# Patient Record
Sex: MALE | Race: WHITE
[De-identification: names, ages, dates, MRNs, and addresses within clinical notes are randomized per-mention and may not be internally consistent; named-entity substitution may affect disease eponyms.]

---

## 2017-06-25 ENCOUNTER — HOSPITAL ENCOUNTER (EMERGENCY)
Dept: HOSPITAL 25 - ED | Age: 72
Discharge: HOME | End: 2017-06-25
Payer: MEDICARE

## 2017-06-25 VITALS — DIASTOLIC BLOOD PRESSURE: 69 MMHG | SYSTOLIC BLOOD PRESSURE: 115 MMHG
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
## 2017-06-25 DIAGNOSIS — S50.311A: ICD-10-CM

## 2017-06-25 DIAGNOSIS — S63.259A: Primary | ICD-10-CM
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
## 2017-06-25 PROCEDURE — 73140 X-RAY EXAM OF FINGER(S): CPT

## 2017-06-25 PROCEDURE — 99283 EMERGENCY DEPT VISIT LOW MDM: CPT

## 2017-06-25 NOTE — RAD
INDICATION:  Left fifth finger injury.



TECHNIQUE: 3 views of the left fifth finger were obtained.



FINDINGS: The middle phalanx is subluxed posterior and medial relative to the proximal

phalanx. No acute fracture is seen. There is an old healed boxer's fracture of the fifth

metacarpal.  



IMPRESSION:  SUBLUXATION OF THE PROXIMAL INTERPHALANGEAL JOINT.

## 2017-06-25 NOTE — RAD
INDICATION:  Right fifth finger subluxation status post reduction.



COMPARISON: Comparison is made with the prereduction films of the same day.



TECHNIQUE: 3 views of the right fifth finger were obtained.



FINDINGS: The bones are in normal alignment. There has been interval reduction of the

previously noted subluxation of the proximal interphalangeal joint.  No acute fracture is

seen. There is an old healed boxer's fracture of the fifth metacarpal.



There is mild osteoarthritic change in the proximal and distal interphalangeal joints.



IMPRESSION:  STATUS POST EXTERNAL REDUCTION. THE BONES ARE IN NORMAL ALIGNMENT.

## 2017-06-25 NOTE — ED
Dirk HOYT Alok, scribed for Fernandez Villalpando MD on 06/25/17 at 1436 .





Adult Trauma





- HPI Summary


HPI Summary: 


72M presents to the ED BIBA following fall off of ladder. Pt was reportedly on 

a ladder indoors when he lost his balance reportedly falling 3 feet to the 

ground. Pt states he landed on his back and hit the back of his head on a 

plastic surface. Pt notes right elbow pain and right 5th finger pain. Pt denies 

neck pain or LOC.








- History of Current Complaint


Chief Complaint: EDTraumaMultiple


Stated Complaint: FALL 12 FEET


Time Seen by Provider: 06/25/17 14:25


Hx Obtained From: Patient


Mechanism of Injury: Fall


Ambulatory at the Scene: Yes


Loss of Consciousness: no loss of consciousness


Onset/Duration: Traumatic, Still Present


Onset of Pain: Immediate


Onset Severity: Moderate


Current Severity: Moderate


Pain Intensity: 7


Pain Scale Used: 0-10 Numeric


Location: Head, Extremities


Aggravating Factor(s): Nothing


Alleviating Factor(s): Nothing


Associated Signs & Symptoms: Negative: Loss of Consciousness





- Allergy/Home Medications


Allergies/Adverse Reactions: 


 Allergies











Allergy/AdvReac Type Severity Reaction Status Date / Time


 


No Known Allergies Allergy   Verified 03/07/13 07:56














PMH/Surg Hx/FS Hx/Imm Hx


Endocrine/Hematology History: 


   Denies: Hx Anticoagulant Therapy, Hx Diabetes, Hx Thyroid Disease


Cardiovascular History: 


   Denies: Hx Hypertension, Hx Pacemaker/ICD


Respiratory History: 


   Denies: Hx Asthma, Hx Chronic Obstructive Pulmonary Disease (COPD)


 History: 


   Denies: Hx Renal Disease


Neurological History: 


   Denies: Hx Dementia, Hx Seizures


Psychiatric History: 


   Denies: Hx Substance Abuse





- Immunization History


Date of Tetanus Vaccine: 2007


Infectious Disease History: No


Infectious Disease History: 


   Denies: Hx Hepatitis, Hx Human Immunodeficiency Virus (HIV), Traveled 

Outside the [place] in Last 30 Days





- Family History


Known Family History: 


   Negative: Cardiac Disease, Hypertension, Diabetes





- Social History


Occupation: Employed Full-time


Lives: With Family


Substance Use Type: Reports: None





Review of Systems


Negative: Fever


Positive: Other - right elbow and right 5th finger pain. Negative: Neck pain


All Other Systems Reviewed And Are Negative: Yes





Physical Exam


Triage Information Reviewed: Yes


Vital Signs On Initial Exam: 


 Initial Vitals











Temp Pulse Resp BP Pulse Ox


 


 97.4 F   59   18   117/65   100 


 


 06/25/17 14:15  06/25/17 14:15  06/25/17 14:15  06/25/17 14:15  06/25/17 14:15











Vital Signs Reviewed: Yes


Appearance: Positive: Well-Appearing, No Pain Distress


Skin: Positive: Warm, Skin Color Reflects Adequate Perfusion, Dry


Head/Face: Positive: Normal Head/Face Inspection


Eyes: Positive: Normal


ENT: Positive: Normal ENT inspection


Neck: Positive: Supple, Nontender


Respiratory/Lung Sounds: Positive: Clear to Auscultation, Breath Sounds Present


Cardiovascular: Positive: RRR


Abdomen Description: Positive: Nontender, Soft


Bowel Sounds: Positive: Present


Musculoskeletal: Positive: Other - right hand 5th finger PIP dislocation with 

valve varus deformity


Neurological: Positive: Normal


Psychiatric: Positive: Normal, Affect/Mood Appropriate





Diagnostics





- Vital Signs


 Vital Signs











  Temp Pulse Resp BP Pulse Ox


 


 06/25/17 14:18  97.8 F  59  17  117/65  100


 


 06/25/17 14:15  97.4 F  59  18  117/65  100














- Laboratory


Lab Statement: Any lab studies that have been ordered have been reviewed, and 

results considered in the medical decision making process.





- Radiology


  ** Finger XRAY


Xray Interpretation: Positive (See Comments) - IMPRESSION:  SUBLUXATION OF THE 

PROXIMAL INTERPHALANGEAL JOINT.


Radiology Interpretation Completed By: Radiologist





  ** Finger XRAY (Post Reduction)


Xray Interpretation: Positive (See Comments) - IMPRESSION:  STATUS POST 

EXTERNAL REDUCTION. THE BONES ARE IN NORMAL ALIGNMENT.


Radiology Interpretation Completed By: Radiologist





Adult Trauma Course/Dx





- Course


Course Of Treatment: Mr. Malik fell off a ladder he was using to nail on a 

twelve foot ceiling.  He sat hard and hit his right hadn dislocating his 5th 

finger.  He took one look at it and got pale and diaphoretic.  His W/U here 

revealed only some minor laceerations and the PIP dislocation without fracture 

of his right 5th finger.  He was digitally blocked with 25% bupivicaine (2 cc's

) and the finger was reduced easily after anesthesia.  He was michelle taped and D/

C'd in stable condition.





- Diagnoses


Provider Diagnoses: 


 Dislocation, finger closed, Abrasions of multiple sites








Discharge





- Discharge Plan


Condition: Stable


Disposition: HOME


Patient Education Materials:  Finger Dislocation (ED)


Referrals: 


Tad Moore MD [Primary Care Provider] - 





The documentation as recorded by the scribDirk alcantara Alok accurately reflects 

the service I personally performed and the decisions made by me, Fernandez Villalpando MD.

## 2019-06-17 ENCOUNTER — HOSPITAL ENCOUNTER (INPATIENT)
Dept: HOSPITAL 25 - ED | Age: 74
Discharge: HOME | DRG: 310 | End: 2019-06-17
Attending: HOSPITALIST | Admitting: INTERNAL MEDICINE
Payer: MEDICARE

## 2019-06-17 VITALS — SYSTOLIC BLOOD PRESSURE: 102 MMHG | DIASTOLIC BLOOD PRESSURE: 71 MMHG

## 2019-06-17 DIAGNOSIS — I50.9: ICD-10-CM

## 2019-06-17 DIAGNOSIS — R79.89: ICD-10-CM

## 2019-06-17 DIAGNOSIS — Z85.46: ICD-10-CM

## 2019-06-17 DIAGNOSIS — R00.0: ICD-10-CM

## 2019-06-17 DIAGNOSIS — R74.0: ICD-10-CM

## 2019-06-17 DIAGNOSIS — Z66: ICD-10-CM

## 2019-06-17 DIAGNOSIS — R01.1: ICD-10-CM

## 2019-06-17 DIAGNOSIS — Z85.820: ICD-10-CM

## 2019-06-17 DIAGNOSIS — I48.91: Primary | ICD-10-CM

## 2019-06-17 DIAGNOSIS — M19.90: ICD-10-CM

## 2019-06-17 DIAGNOSIS — D75.89: ICD-10-CM

## 2019-06-17 DIAGNOSIS — Z79.899: ICD-10-CM

## 2019-06-17 DIAGNOSIS — Z80.42: ICD-10-CM

## 2019-06-17 DIAGNOSIS — F17.200: ICD-10-CM

## 2019-06-17 DIAGNOSIS — I08.1: ICD-10-CM

## 2019-06-17 LAB
ALBUMIN SERPL BCG-MCNC: 3.8 G/DL (ref 3.2–5.2)
ALBUMIN/GLOB SERPL: 1.7 {RATIO} (ref 1–3)
ALP SERPL-CCNC: 58 U/L (ref 34–104)
ALT SERPL W P-5'-P-CCNC: 181 U/L (ref 7–52)
ANION GAP SERPL CALC-SCNC: 4 MMOL/L (ref 2–11)
APTT PPP: 34.9 SECONDS (ref 26–38)
AST SERPL-CCNC: 141 U/L (ref 13–39)
BASOPHILS # BLD AUTO: 0 10^3/UL (ref 0–0.2)
BUN SERPL-MCNC: 26 MG/DL (ref 6–24)
BUN/CREAT SERPL: 23 (ref 8–20)
CALCIUM SERPL-MCNC: 9 MG/DL (ref 8.6–10.3)
CHLORIDE SERPL-SCNC: 110 MMOL/L (ref 101–111)
CHOLEST SERPL-MCNC: 131 MG/DL
EOSINOPHIL # BLD AUTO: 0.1 10^3/UL (ref 0–0.6)
FOLATE SERPL-MCNC: 13.79 NG/ML (ref 3.99–?)
GLOBULIN SER CALC-MCNC: 2.3 G/DL (ref 2–4)
GLUCOSE SERPL-MCNC: 104 MG/DL (ref 70–100)
HCO3 SERPL-SCNC: 27 MMOL/L (ref 22–32)
HCT VFR BLD AUTO: 40 % (ref 42–52)
HDLC SERPL-MCNC: 44.3 MG/DL
HGB BLD-MCNC: 13.4 G/DL (ref 14–18)
INR PPP/BLD: 1.23 (ref 0.82–1.09)
LYMPHOCYTES # BLD AUTO: 1.2 10^3/UL (ref 1–4.8)
MAGNESIUM SERPL-MCNC: 2.1 MG/DL (ref 1.9–2.7)
MCH RBC QN AUTO: 32 PG (ref 27–31)
MCHC RBC AUTO-ENTMCNC: 34 G/DL (ref 31–36)
MCV RBC AUTO: 95 FL (ref 80–94)
MONOCYTES # BLD AUTO: 0.3 10^3/UL (ref 0–0.8)
NEUTROPHILS # BLD AUTO: 3.1 10^3/UL (ref 1.5–7.7)
NRBC # BLD AUTO: 0 10^3/UL
NRBC BLD QL AUTO: 0
PLATELET # BLD AUTO: 175 10^3/UL (ref 150–450)
POTASSIUM SERPL-SCNC: 4.7 MMOL/L (ref 3.5–5)
PROT SERPL-MCNC: 6.1 G/DL (ref 6.4–8.9)
RBC # BLD AUTO: 4.2 10^6 /UL (ref 4.18–5.48)
SODIUM SERPL-SCNC: 141 MMOL/L (ref 135–145)
TRIGL SERPL-MCNC: 57 MG/DL
TROPONIN I SERPL-MCNC: 0.01 NG/ML (ref ?–0.04)
TSH SERPL-ACNC: 3.99 MCIU/ML (ref 0.34–5.6)
WBC # BLD AUTO: 4.7 10^3/UL (ref 3.5–10.8)

## 2019-06-17 PROCEDURE — 80074 ACUTE HEPATITIS PANEL: CPT

## 2019-06-17 PROCEDURE — 5A2204Z RESTORATION OF CARDIAC RHYTHM, SINGLE: ICD-10-PCS | Performed by: SPECIALIST

## 2019-06-17 PROCEDURE — 83880 ASSAY OF NATRIURETIC PEPTIDE: CPT

## 2019-06-17 PROCEDURE — 83735 ASSAY OF MAGNESIUM: CPT

## 2019-06-17 PROCEDURE — B246ZZ4 ULTRASONOGRAPHY OF RIGHT AND LEFT HEART, TRANSESOPHAGEAL: ICD-10-PCS | Performed by: SPECIALIST

## 2019-06-17 PROCEDURE — 80061 LIPID PANEL: CPT

## 2019-06-17 PROCEDURE — 83036 HEMOGLOBIN GLYCOSYLATED A1C: CPT

## 2019-06-17 PROCEDURE — 99156 MOD SED OTH PHYS/QHP 5/>YRS: CPT

## 2019-06-17 PROCEDURE — 99284 EMERGENCY DEPT VISIT MOD MDM: CPT

## 2019-06-17 PROCEDURE — 85379 FIBRIN DEGRADATION QUANT: CPT

## 2019-06-17 PROCEDURE — 93312 ECHO TRANSESOPHAGEAL: CPT

## 2019-06-17 PROCEDURE — 76705 ECHO EXAM OF ABDOMEN: CPT

## 2019-06-17 PROCEDURE — 82746 ASSAY OF FOLIC ACID SERUM: CPT

## 2019-06-17 PROCEDURE — 93325 DOPPLER ECHO COLOR FLOW MAPG: CPT

## 2019-06-17 PROCEDURE — 84443 ASSAY THYROID STIM HORMONE: CPT

## 2019-06-17 PROCEDURE — 80053 COMPREHEN METABOLIC PANEL: CPT

## 2019-06-17 PROCEDURE — 85730 THROMBOPLASTIN TIME PARTIAL: CPT

## 2019-06-17 PROCEDURE — 93005 ELECTROCARDIOGRAM TRACING: CPT

## 2019-06-17 PROCEDURE — 82607 VITAMIN B-12: CPT

## 2019-06-17 PROCEDURE — 99157 MOD SED OTHER PHYS/QHP EA: CPT

## 2019-06-17 PROCEDURE — 36415 COLL VENOUS BLD VENIPUNCTURE: CPT

## 2019-06-17 PROCEDURE — 71275 CT ANGIOGRAPHY CHEST: CPT

## 2019-06-17 PROCEDURE — 85025 COMPLETE CBC W/AUTO DIFF WBC: CPT

## 2019-06-17 PROCEDURE — 85610 PROTHROMBIN TIME: CPT

## 2019-06-17 PROCEDURE — 84484 ASSAY OF TROPONIN QUANT: CPT

## 2019-06-17 PROCEDURE — 71045 X-RAY EXAM CHEST 1 VIEW: CPT

## 2019-06-17 RX ADMIN — DILTIAZEM HYDROCHLORIDE SCH MG: 30 TABLET, FILM COATED ORAL at 17:27

## 2019-06-17 RX ADMIN — DILTIAZEM HYDROCHLORIDE SCH: 30 TABLET, FILM COATED ORAL at 14:17

## 2019-06-17 RX ADMIN — DILTIAZEM HYDROCHLORIDE SCH MG: 30 TABLET, FILM COATED ORAL at 06:40

## 2019-06-17 NOTE — CARD
CC:  Dr. St, [facility] *

 

CARDIOVERSION REPORT:

 

DATE OF PROCEDURE:  06/17/19 - ROOM #[id number]

 

PROCEDURE:  Cardioversion.

 

INDICATION:  Atrial fibrillation.

 

The patient is a 74-year-old gentleman with very little past medical history, 
who presented with shortness of breath and palpitations.  He was found to be in 
atrial fibrillation.  The patient had just undergone a transesophageal 
echocardiogram demonstrating normal LV size and systolic function.  No evidence 
of thrombus in the left atrial appendage.  Cardioversion was recommended.

 

DESCRIPTION OF PROCEDURE:  The patient was given an additional 2 mg of Versed 
for conscious sedation.  The patient was cardioverted with 150 joules of 
synchronized biphasic energy.  The patient converted to normal sinus rhythm, 
which lasted about 2 minutes and then back to atrial fibrillation.

 

Repeat cardioversion was recommended.  The cardioversion normal sinus rhythm at 
that time.  Further recommendations pending his hospital course.



Patient maintained NSR   Patient will be discharged on Eliquis, Multaq and 
Metoprolol.  Follow up in 2-3 weeks  

 

 [id number]/CPS #: [id number]

ALEIDA

## 2019-06-17 NOTE — HP
HISTORY AND PHYSICAL:

 

DATE OF ADMISSION:  06/17/19

 

PRIMARY CARE PROVIDER:  Dr. Thalia St.

 

:  Mattie Malik, patient's wife.

 

CHIEF COMPLAINT:  Palpitations.

 

CODE STATUS:  DNR.

 

SOURCE OF INFORMATION:  HPI is obtained from the patient.  He is an excellent 
historian.

 

HISTORY OF PRESENT ILLNESS:  This is a 74-year-old male with a past medical 
history of prostate cancer, status post prostatectomy; distant local melanoma, 
status post excision who, presents with complaint of palpitations over 2 weeks.
  Patient reports that he has had this sensation of his heart is racing 
intermittently for 2 weeks.  It has been accompanied with shortness of breath 
and dizziness.  They come and go.  These episodes then last several minutes, 
but he is able to go about his day and do his activities of daily living.  Over 
the last 7 days, he has had worsening sensation of shortness of breath, 
particularly when lying flat and has had several instances of orthopnea and 
paroxysmal nocturnal dyspnea where he wakes from sleep gasping for breath.  
Because of these persistent symptoms that did not seem to vaibhav with any 
maneuvers, patient decided to present to the emergency room. At baseline, 
patient is quite healthy and is able to run 4 miles a day and he reports that 
for the last 2 weeks, he has been unable to run more than a quarter of a mile 
secondary to shortness of breath.  He denies any suzi chest pain during these 
events and has no other associated symptoms.  No syncope and no dizziness.

 

EMERGENCY ROOM COURSE:  Vital signs on arrival, blood pressure of 139/90, pulse 
rate 70, sinus initially, temperature 97.2, respiratory rate 17, satting 100% 
on room air.  Once in the emergency room and placed on the monitor, he had 
tachycardia to the one teens and an EKG showed atrial fibrillation with 
ventricular rate with T- wave inversions in 2, 3.  Labs were drawn, which 
showed macrocytosis without anemia and elevated D-dimer of 355, elevated AST 
and ALT and a BNP of 466.  A chest x-ray showed prominent vasculature 
bilaterally.  A CTA was also completed to rule out pulmonary embolism, which by 
wet read was negative, although official report is still pending at the time of 
dictation.  In the emergency room, he received diltiazem 20 mg with good effect
, aspirin 324 and 1 L of IV fluid bolus.  Because of these persistent symptoms 
of new AFib, the hospitalist team was asked to evaluate and admit this patient 
for further workup.

 

PAST MEDICAL HISTORY:

1.  Prostate cancer, status post prostatectomy.

2.  Distant history of melanoma, status post excision.

 

PAST SURGICAL HISTORY:

1.  Status post prostatectomy.

2.  Bilateral rotator cuff repair.

 

SOCIAL HISTORY:  Patient is retired [employer] employee and currently self-employed 
as farmer with an orchard.  He lives with his wife.  Tobacco:  He is a current 
oral tobacco user with dips and has been for 20 years.  Alcohol:  Has abstained 
for the last 15 years.  In the past with distant alcohol use disorder.  Illicits
:  Never.

 

FAMILY HISTORY:  Positive for father who had prostate cancer and mother who is 
healthy.

 

ALLERGIES:  None.

 

MEDICATIONS:  None.

 

REVIEW OF SYSTEMS:  Constitutional:  Negative for fever or chills.  HEENT: 
Negative for vision changes, headaches, or sore throat.  Cardiovascular:  
Positive for palpitations, orthopnea, or paroxysmal nocturnal dyspnea and 
negative for chest pain.  Respiratory:  Positive for shortness of breath.  
Negative for cough, pleuritic chest pain.  GI:  Negative for nausea, vomiting, 
diarrhea, or abdominal pain.  :  Negative for dysuria, hematuria.  
Musculoskeletal:  Negative for myalgias, arthralgias or weakness.  Skin:  
Negative for rashes or lesions. Neurologic:  Negative for focal weakness or 
numbness.  Psychiatric:  Negative for anxiety or depression.  Endocrine:  
Negative for polyuria, polydipsia.  Heme: Negative for easy bruising, bleeding 
or lymph adenopathy.

 

                               PHYSICAL EXAMINATION

 

GENERAL APPEARANCE:  This is a well-appearing pleasant man in no acute distress
, sitting up in a  stretcher.

 

VITAL SIGNS:  At the time of physical exam, heart rate 115 in atrial 
fibrillation, blood pressure is 136/84, respiratory rate is 15, satting 100% on 
room air.

 

HEENT:  Pupils are equal and reactive.  Extraocular muscles are intact.  
Sclerae are anicteric.  Mouth has moist mucous membranes.

 

NECK:  Supple with no supraclavicular or cervical lymphadenopathy.

 

RESPIRATORY:  Patient has mild crackles in right lung base, but otherwise clear 
to auscultation.

 

CARDIAC:  Irregularly irregular with no murmur, rub, or gallop.  He has mildly 
elevated JVD at 6 cm.

 

ABDOMEN:  Belly is soft, nontender, nondistended with normoactive bowel sounds.

 

SKIN:  Without rashes or lesions.

 

MUSCULOSKELETAL:  He moves all 4 extremities without pain.

 

EXTREMITIES:  He has trace pitting edema in bilateral lower extremities and 2+ 
pulses and bilateral lower extremity DPs.

 

NEURO:  Cranial nerves II through XII are intact.  He has no focal deficits.  
He is A and O x4.

 

 LABORATORY DATA:  White blood cell count 4.7, hemoglobin 13.4, hematocrit 40, 
platelets 175.  INR 1.23.  D-dimer is 355.  CMP:  Sodium 141, potassium 4.7, 
chloride 110, carbon dioxide 27, BUN 26, creatinine 1.13, glucose 104, 
magnesium 2.1, total bili is 0.7, , , alk phos 58.  Troponin 
0.01.  . TSH 3.99.

 

IMAGING:  Includes a chest x-ray which shows a bilateral prominent vasculature 
and increased interstitial markings.  EKG that shows atrial fibrillation with 
ventricular rate 115, T-wave inversions in the 2 and 3.  CTA of chest is pending
, but per wet read, no  acute pulmonary embolism.

 

Imaging, labs and EKG were reviewed by myself.

 

ASSESSMENT AND PLAN:  This is a 74-year-old man with past medical history of 
distant prostate cancer, who presents with history of subacute palpitations and 
dyspnea on exertion and was found to have new onset of atrial fibrillation and 
signs of volume overload concerning for new onset heart failure.

 

1.  Atrial fibrillation.  The patient has been in and out of this rhythm for 2 
weeks.  His WIN-VASc score is 1.  He has no known risk factors.  Pulmonary 
embolism has been ruled out.  He would consider a sleep apnea screen.  We will 
consult cardiology to discuss ultimate management of rate, rhythm control or 
other options such as cardioversion moving forward.  As above, WIN-VASc score 
is 1 and long-term anticoagulation is a shared decision making process with 
patient and primary care provider.  I will therapeutically anticoagulate 
patient one time with Lovenox and ultimate decision on long-term 
anticoagulation can be made in the course of this hospitalization.

2.  Volume overload Patient appears to have volume overload and possibly this 
is tachycardia mediated mild heart failure.  An echocardiogram has been 
ordered.  We would consider stress test in this patient if had more convincing 
risk factors and per Cardiology decision.  We will diurese with Lasix 40 mg IV 
x1 now and can diurese as tolerated.

3.  Elevated liver function testing.  Has a non-cholestatic pattern, it is 
possibly congestive hepatopathy from heart failure. We will send off hepatitis 
panel as well as a liver ultrasound.

4.  Mild anemia with macrocytosis, folate and B12 were ordered as part of this 
workup.

5.  Distant prostate cancer.  No active issues.

6.  FEN.  NPO for liver ultrasound and can be upgraded to heart-healthy diet.

7.  DVT prophylaxis.  Patient is therapeutically anticoagulated with Lovenox. 
Decision to continue therapeutic anticoagulation versus prophylactic can be 
made over the course of his hospital stay.

8.  Code status is DNR.  MOLST completed at the bedside.

9.  Disposition.  Patient is stable for admission to [address] [place] [address] for 
telemetry.

 

TIME SPENT:  Forty five minutes was spent in the planning of this admission 
with over half of that spent directly at the bedside with the patient providing 
direct patient care.  Plan of care discussed with patient and family, they had 
no further questions.

 

 

 

[id number]/CPS #: [id number]

ALEIDA

## 2019-06-17 NOTE — CONS
CC:  [facility] *

 

CARDIOLOGY CONSULTATION:

 

DATE OF CONSULT:  06/17/19

 

INDICATION FOR CONSULTATION:  Atrial fibrillation.

 

HISTORY OF PRESENT ILLNESS:  The patient is a 74-year-old gentleman with little 
past medical history, who came to the emergency room because of palpitations 
and shortness of breath.  The patient states that for the past 2 weeks or so, 
he has been noticing an increase in palpitations and episodes of heart racing.  
The patient states for the last 3 nights, it has been difficult for him to lie 
flat, he has been sitting up in a chair or pacing because of his shortness of 
breath.  He denies any chest pain.  He denies lightheadedness, dizziness, or 
syncope.

 

The patient is very physically active.  He runs on a regular basis.  He has 
been unable to run for the last four days or so.

 

The patient states he has a history of "murmur" and has had palpitations most 
of his life.  Generally, they are brief and do not interfere with his lifestyle.

 

The patient denies any lower extremity edema.  He does have orthopnea.

 

PAST MEDICAL HISTORY:  Significant for prostate enlargement, arthritis, and 
melanoma.

 

PAST SURGICAL HISTORY:

1.  Bilateral shoulder rotator cuff surgeries.

2.  Melanoma surgery.

3.  Prostatectomy.

 

OUTPATIENT MEDICATION:  Ciprofloxacin.

 

ALLERGIES:  No known drug allergies.

 

FAMILY HISTORY:  No family history of early coronary artery disease or cardiac 
arrhythmias.

 

SOCIAL HISTORY:  He is retired.  He is .  He is very physically active.  
He denies tobacco or alcohol use.

 

REVIEW OF SYSTEMS:  Negative for fevers and chills.  Negative for changes in 
bowel or bladder habits.  Negative for change in weight.  Other 12-point review 
is unremarkable.

 

PHYSICAL EXAM:  Height is 6 feet, weight is 187 pounds.  Temperature 98, heart 
rate 84, blood pressure 104/78, respiratory rate is 20, oxygen saturation 95% 
on room air.  Sclerae anicteric.  Oropharynx is pink without erythema.  
Carotids are 2+ without bruits.  JVD is normal.  Thyroid is normal.  Cardiac 
Exam:  S1, S2, irregular.  No murmurs, rubs, or gallops.  PMI is normal.  Lungs 
are clear to auscultation bilaterally.  No dullness to percussion.  Abdomen is 
soft, nontender, and nondistended with normoactive bowel sounds.  Extremities 
show no edema.  He has 2+ pulses throughout.  The patient is awake, alert, and 
oriented.  He moves all 4 extremities equally.

 

DIAGNOSTIC STUDIES/LAB DATA:  Chemistries within normal limits.  BUN 26, 
creatinine 1.1.  AST and ALT are slightly elevated at 141 and 181.  BNP is 416.
  Total cholesterol 131.  LDL cholesterol 75.  TSH 3.99.  CBC within normal 
limits.

 

EKG shows atrial fibrillation with LVH.

 

IMPRESSION:  This is a 74-year-old gentleman with little past medical history, 
who came to the hospital because of increasing shortness of breath and 
palpitations. The patient was found to be in atrial fibrillation.  The patient 
was started on enoxaparin 80 mg twice a day and a Cardizem drip.

 

The patient's CT of the chest showed no evidence of pulmonary embolism.

 

The patient has slightly elevated transaminase levels, this could be due to 
passive congestion from mild congestive heart failure.  The patient was given a 
diuretic in the emergency room and had a significant diuresis.

 

PLAN/RECOMMENDATIONS:  For now my recommendations, continue on his current 
medications.  I did talk to the patient at length regarding treatment of his 
atrial fibrillation, talked about anticoagulation, antiarrhythmic medications, 
potential cardioversion, and ablative therapy.

 

For now, the plan is to continue his anticoagulation.  The patient is already 
n.p.o.

 

We will schedule for a transesophageal echocardiogram today and potential 
cardioversion back to normal sinus rhythm.  After he is back in normal sinus 
rhythm, the patient will be started on oral anticoagulants and antiarrhythmics.
  I will probably send him out on Multaq.  The patient will be seen in follow 
up. Further recommendations pending results of his transesophageal 
echocardiogram and cardioversion.

 

 [id number]/CPS #: [id number]

ALEIDA

## 2019-06-17 NOTE — ED
HPI Cardiac





- HPI Summary


HPI Summary: 


Patient is a 73 y/o M presenting to ED with complaints of palpitations. 

Palpitations are characterized as "racing" and he reports that palpitations 

have been present intermittently for the past two weeks. He notes some 

dizziness and SOB with palpitations. He also states that he has been getting 

panic attacks at night, stating that when he lies down it feels like something 

is "choking my throat". Patient notes a few days ago he became SOB and 

experienced palpations while running and that he was not able to run more than 1

/4 of a mile. He states that he runs around 4 miles regularly. He denies any 

daily medications, alcohol and substances usage. Patient is a non-smoker. No 

pain at present. On triage, pain is denied, nothing is noted to aggravate/

alleviate Sx. Home medications and allergies are reviewed. 








- History of Current Complaint


Stated Complaint: RAPID HR PER PT


Hx Obtained From: Patient


Onset/Duration: Started Weeks Ago - a couple weeks ago


Timing: Intermittent - a couple weeks ago


Current Severity: None - pain denied


Pain Intensity: 0


Pain Scale Used: 0-10 Numeric


Character: Fast - "racing"


Aggravating Factor(s): Nothing


Alleviating Factor(s): Nothing


Associated Signs and Symptoms: Positive: Dizziness, Shortness of Breath





- Allergy/Home Medications


Allergies/Adverse Reactions: 


 Allergies











Allergy/AdvReac Type Severity Reaction Status Date / Time


 


No Known Allergies Allergy   Verified 06/17/19 01:34











Home Medications: 


 Home Medications





Ciprofloxacin TAB* [Cipro 500 MG TAB*] 1 tab PO BID 06/17/19 [History Confirmed 

06/17/19]











PMH/Surg Hx/FS Hx/Imm Hx


Endocrine/Hematology History: 


   Denies: Hx Anticoagulant Therapy, Hx Diabetes, Hx Thyroid Disease


Cardiovascular History: 


   Denies: Hx Hypertension, Hx Pacemaker/ICD


Respiratory History: 


   Denies: Hx Asthma, Hx Chronic Obstructive Pulmonary Disease (COPD)


 History: 


   Denies: Hx Renal Disease


Neurological History: 


   Denies: Hx Dementia, Hx Seizures


Psychiatric History: 


   Denies: Hx Substance Abuse





- Immunization History


Date of Tetanus Vaccine: 2007


Infectious Disease History: No


Infectious Disease History: 


   Denies: Hx Hepatitis, Hx Human Immunodeficiency Virus (HIV), Traveled 

Outside the [place] in Last 30 Days





- Family History


Known Family History: 


   Negative: Cardiac Disease, Hypertension, Diabetes





- Social History


Alcohol Use: Occasionally


Substance Use Type: Reports: None


Smoking Status (MU): Former Smoker





Review of Systems


Constitutional: Other - positive - dizziness 


Positive: Palpitations


Positive: Shortness Of Breath


All Other Systems Reviewed And Are Negative: Yes





Physical Exam





- Summary


Physical Exam Summary: 


VITAL SIGNS: Reviewed.


GENERAL:  Patient is a well-developed and nourished male who is lying 

comfortable in the stretcher. Patient is not in any acute respiratory distress.


HEAD AND FACE: No signs of trauma. No ecchymosis, hematomas or skull 

depressions. No sinus tenderness.


EYES: PERRLA, EOMI x 2, No injected conjunctiva, no nystagmus.


EARS: Hearing grossly intact. Ear canals and tympanic membranes are within 

normal limits.


MOUTH: Oropharynx within normal limits.


NECK: Supple, trachea is midline, no adenopathy, no JVD, no carotid bruit, no c-

spine tenderness, neck with full ROM


CHEST: Symmetric, no tenderness at palpation


LUNGS: Clear to auscultation bilaterally. No wheezing or crackles.


CVS: irregularly tachycardic, S1 and S2 present, no murmurs or gallops 

appreciated.


ABDOMEN: Soft, non-tender. No signs of distention. No rebound no guarding, and 

no masses palpated. Bowel sounds are normal.


EXTREMITIES: FROM in all major joints, no edema, no cyanosis or clubbing.


NEURO: Alert and oriented x 3. No acute neurological deficits. Speech is normal 

and follows commands.


SKIN: Dry and warm








Triage Information Reviewed: Yes


Vital Signs On Initial Exam: 


 Initial Vitals











Temp Pulse Resp BP Pulse Ox


 


 97.2 F   65   17   139/90   100 


 


 06/17/19 01:32  06/17/19 01:32  06/17/19 01:32  06/17/19 01:32  06/17/19 01:32











Vital Signs Reviewed: Yes





Diagnostics





- Vital Signs


 Vital Signs











  Temp Pulse Resp BP Pulse Ox


 


 06/17/19 01:32  97.2 F  65  17  139/90  100














- Laboratory


Result Diagrams: 


 06/17/19 02:13





 06/17/19 02:13


Lab Statement: Any lab studies that have been ordered have been reviewed, and 

results considered in the medical decision making process.





- Radiology


  ** CXR


Radiology Interpretation Completed By: ED Physician


Summary of Radiographic Findings: CXR: bilateral increased markings, pending 

official report.





- CT


  ** CTA CHEST/THORAX


CT Interpretation Completed By: Radiologist


Summary of CT Findings: CTA CHEST/THORAXIMPRESSION:  1. No pulmonary emboli.  

2. Groundglass centrilobular nodules with intralobular septal thickening,.  

particularly in the right upper lobe, which may be due to atypical pneumonia.  

3. Small right pleural effusion.  4. Cardiomegaly with evidence of right 

cardiac dysfunction.  THIS REPORT WAS REVIEWED BY DR. GORDON.





- EKG


  ** 0200


Cardiac Rate: Other Rate - afib with rate of 117 BPM


EKG Rhythm: Atrial Fibrillation


Summary of EKG Findings: EKG showed afib with rate of 117 BPM, ST wave 

inversion in inferior leads.





Disposition





- Course


Course Of Treatment: Patient is a 73 y/o M presenting to ED with complaints of 

palpitations. Palpitations are characterized as "racing" and he reports that 

palpitations have been present intermittently for the past two weeks. He notes 

some dizziness and SOB with palpitations. He also states that he has been 

getting panic attacks at night, stating that when he lies down it feels like 

something is "choking my throat". Patient notes a few days ago he became SOB 

and experienced palpations while running and that he was not able to run more 

than 1/4 of a mile. He states that he runs around 4 miles regularly. He denies 

any daily medications, alcohol and substances usage. Patient is a non-smoker. 

No pain at present. On physical exam, patient is noted to be irregularly 

tachycardic. EKG showed afib with rate of 117 BPM, ST wave inversion in 

inferior leads. CXR: Bilateral increased markings.  CTA CHEST/THORAXIMPRESSION:

  1. No pulmonary emboli.  2. Groundglass centrilobular nodules with 

intralobular septal thickening,.  particularly in the right upper lobe, which 

may be due to atypical pneumonia.  3. Small right pleural effusion.  4. 

Cardiomegaly with evidence of right cardiac dysfunction.  Labs showed Hgb 13.4, 

Hct 40, MCV 95, MCH 32, INR 1.23, D-dimer 355, BUN 26, BUN/creatinine ratio 23, 

glucose 104, , , trop 0.01, , total protein 6.1.  During 

ED course, patient received fluids, diltiazem 20 mg IV SLOW PU ED,  mg 

PO ED.  0507 - Patient's case was discussed with Dr. Beckford, Dr. Beckford accepts for 

admission.





- Diagnoses


Provider Diagnoses: 


 A-fib








- Physician Notifications


Discussed Care Of Patient With: Anna Beckford


Time Discussed With Above Provider: 05:07


Instructed by Provider To: Other - 0507 - Patient's case was discussed with Dr. Beckford, Dr. Beckford accepts for admission.





Discharge





- Sign-Out/Discharge


Documenting (check all that apply): Patient Departure - admit





- Discharge Plan


Condition: Good


Disposition: ADMITTED TO [facility]


Referrals: 


Thalia St MD [Primary Care Provider] - 





- Attestation Statements


Document Initiated by Scribe: Yes


Documenting Scribe: LUCILLE LOBO


Provider For Whom Scribe is Documenting (Include Credential): JOSHUA GORDON MD


Scribe Attestation: 


LUCILLE HOYT, scribed for JOSHUA GORDON MD on 06/17/19 at 0512. 


Status of Scribe Document: Ready

## 2019-06-18 NOTE — DS
DISCHARGE SUMMARY:

 

DATE OF ADMISSION:  06/17/19

 

DATE OF DISCHARGE:  06/17/19

 

ATTENDING PROVIDER:  Anna Beckford MD.

 

ATTENDING PHYSICIAN WHILE DATE ON DISCHARGE:  Angel Mccord MD.

 

PRIMARY CARE PROVIDER:  Dr. Thalia St.

 

CONSULTING CARDIOLOGIST:  Dr. Jorden Oleary.

 

CHIEF COMPLAINT:  Palpitations, shortness of breath, dyspnea on exertion.

 

PRINCIPAL DIAGNOSIS:  New-onset atrial fibrillation with rapid ventricular 
response; mild congestive heart failure, likely secondary to tachyarrhythmia.

 

HISTORY OF PRESENT ILLNESS AND HOSPITAL COURSE:  Ricky Malik is a 74-year-
old male with past medical history of prostate cancer; status post prostatectomy
, local melanoma; status post excision who is otherwise in excellent health and 
near daily long distance runner.  Please see H and P of Dr. Anna Beckford for full 
details, but briefly, he has intermittently noticed racing of his heart for 2 
weeks accompanied by shortness of breath and dizziness.  He had worse shortness 
of breath on lying down flat for the last 7 days consistent with orthopnea and 
paroxysmal nocturnal dyspnea.  Upon presentation to the [facility], he 
had an EKG showing atrial fibrillation with RVR.  D-dimer was elevated at 355.  
His BNP was 466.  CTA chest ruled out pulmonary embolism, shows small right 
pleural effusion, and ground-glass subpleural lung nodules with interlobular 
septal thickening, particularly in the right upper lobe, which may be due to 
atypical pneumonia.  He was given IV diltiazem in the emergency room and then 
transitioned to oral diltiazem 30 q.6.  He was also started on Lovenox 1 mg/kg 
q.12 and seen in consultation with Dr. Oleary of cardiology who recommended ISAAC 
with potential cardioversion.  This occurred later in the afternoon and 
required 2 shocks, the first one did not maintain him in normal rhythm, but the 
second one did.  He was recommended discharge home with new prescription for 
antiarrhythmic Multaq, low-dose beta blocker, and Eliquis with close cardiology 
followup recommended.  Additional evaluation included slight transaminase 
elevation with , ALT at 181.  The T-bili and alk phos were normal.  This 
was possibly some mild hepatic congestion secondary to mild heart failure (he 
was on room air during the admission and they gave 1 dose of 40 mg IV Lasix 
there).  An acute hepatitis panel was sent and negative for hepatitis A IgM 
antibody, hepatitis B surface antigen, hepatitis B core IgM antibody, hepatitis 
C antibody, and a limited right upper quadrant ultrasound was also within 
normal limits.

 

DISCHARGE MEDICATIONS:  Include:

1.  Apixaban 5 mg p.o. b.i.d. (new).

2.  Dronedarone (Multaq) 40 mg p.o. b.i.d. (new).

3.  Metoprolol succinate 25 mg p.o. daily (new).

 

FOLLOWUP:  The patient is to follow up with Dr. Jorden Oleary in 1 to 2 weeks 
and should follow up with Dr. Thalia St within 1 to 2 weeks.

 

DISCHARGE DIET:  Heart healthy.

 

DISPOSITION:  Home.

 

CONDITION:  Improved.

 

TIME SPENT ON DISCHARGE:  35 minutes.

 

 [id number]/CPS #: [id number]

ALEIDA

## 2025-06-12 NOTE — TEE
*[facility]*

[place] [address]

Phone: [phone]

Fax #: [phone]



-------------------------------------------------------------------

Transesophageal Echocardiogram



Patient: King,     Height:  72 in /    MRN:        [id number]

         Ricky MONTERO              182.9 cm

:     1945    Weight:  186.6 lb / Study Date: 2019

                                84.8 kg

Age:     74            BP:      134 / 89   Accession#: [id number]

Gender:  M             BMI/BSA: 25.4       HR:         145 bpm

                                kg/m^2 /

                                2.07 m^2



*Sonographer: * Stephanie Ramirez [facility]

 

*Referring Physician: * Jorden Oleary MD

*Reading Physician: * Jorden Oleary MD



-------------------------------------------------------------------

Indications:   Atrial Fibrillation.



-------------------------------------------------------------------

History:  Prostate cancer, oral tobacco.



-------------------------------------------------------------------

Conclusions



Summary:



1. Left ventricle: The cavity size is normal. Wall thickness is

   mildly to moderately increased. Systolic function is normal. The

   estimated ejection fraction is 55-60%.

2. Right ventricle: Systolic function is normal.

3. Left atrium: There is no evidence of a thrombus in the atrial

   cavity or appendage.

4. Atrial septum: A PFO is not demonstrated by color Doppler or

   agitated saline contrast.

5. Mitral valve: Mild prolapse. There is mild to moderate

   regurgitation.

6. Aortic valve: There is no evidence of stenosis. There is trivial

   regurgitation.

7. Tricuspid valve: There is mild regurgitation.

8. Aortic arch: The aortic arch has a focal area of plaque

   measuring 0.4cm x 0.5cm x 1.2cm.

9. Study data: No prior study is available for comparison.



-------------------------------------------------------------------

Study data:  Diagnostic Transesophageal Echocardiogram  Consent:

The risks and benefits of the procedure, including alternatives

were discussed with the patient and/or their health care

representative and written informed consent was obtained.

Procedure:  Initial setup: The patient was brought to the

laboratory in the fasting state.Intravenous access was obtained.

Surface ECG leads, heart rate, heart rhythm, blood pressure

measurements, pulse oximetric signals, and mainstream end-tidal CO2

tracings were monitored throughout the procedure. Sedation.

Moderate sedation was administered by nursing staff.  History and

physical as well as labs were reviewed. An oral bite block was

inserted for protection of oral dentition. The patient was placed

in the left lateral decubitus position. Topical anesthesia was

obtained using viscous lidocaine. A transesophageal probe was

inserted by the attending cardiologist. Transesophageal

echocardiography was performed, image quality was good, and all

standard views were attempted within the limitations of patient

tolerance and safety. Multiple 2D, color flow Doppler and spectral

Doppler images were obtained. The transesophageal probe was

removed. A bubble study was performed. Images [id number]  Location:

Procedure room.  Patient status:  Inpatient. Patient room number:

432. No prior study is available for comparison.  Study completion:

 The patient tolerated the procedure well. There were no

complications.  Administered medications:   Midazolam, 4mg.

Fentanyl, 50mcg.



-------------------------------------------------------------------

Findings



Left ventricle:  The cavity size is normal. Wall thickness is

mildly to moderately increased. Systolic function is normal. The

estimated ejection fraction is 55-60%. Wall motion is normal; there

are no regional wall motion abnormalities. Left ventricular

diastolic function parameters are indeterminate.

Right ventricle:  The cavity size is normal. Systolic function is

normal.

Left atrium:  The atrium is normal in size.  Emptying velocity is

normal.    There is no evidence of a thrombus in the atrial cavity

or appendage.

Right atrium:  The atrium is normal in size.

Atrial septum:  A PFO is not demonstrated by color Doppler or

agitated saline contrast.

Mitral valve:  The leaflets are normal thickness.  Mild prolapse.

There is no evidence of stenosis.   There is mild to moderate

regurgitation.

Aortic valve:  The annulus is mildly calcified. The valve is

trileaflet. The leaflets are normal thickness.  There is no

evidence of stenosis.   There is trivial regurgitation.

Tricuspid valve:  The leaflets are normal thickness.  There is no

evidence of stenosis.   There is mild regurgitation.

Pulmonic valve:   The leaflets are normal thickness.  There is no

evidence of stenosis.   There is trivial regurgitation.

Aorta:  Aortic root: The aortic root is upper normal in size.

Ascending aorta: The ascending aorta is mildly dilated.

Aortic arch: The aortic arch has a focal area of plaque measuring

0.4cm x 0.5cm x 1.2cm.

Pulmonary arteries:

The main pulmonary artery is normal-sized.

Systemic veins:

Inferior vena cava: Poorly visualized.

Superior vena cava: The vessel is appears normal.



-------------------------------------------------------------------

Measurements



 Aortic valve        Value       Ref   Aortic root            Value    Ref

 Luz diam, ED        2.3   cm    ----  Root diam              3.5   cm &lt;4.2

 Peak v, S           1.07  m/sec ----

 Peak grad, S        5.0   mm Hg ----  Ascending aorta        Value    Ref

                                       AAo AP diam, S         3.7   cm ----

 Mitral valve        Value       Ref

 Peak E              0.52  m/sec ----

 Peak A              0.02  m/sec ----

 Decel time          55    ms    ----

 Peak E/A ratio      21.8        ----

 

Legend:

(L)  and  (H)  nanette values outside specified reference range.



Prepared and electronically signed by



Jorden Oleary MD

2019 17:09
Derek John - /children/spouse